# Patient Record
Sex: FEMALE | Race: BLACK OR AFRICAN AMERICAN | NOT HISPANIC OR LATINO
[De-identification: names, ages, dates, MRNs, and addresses within clinical notes are randomized per-mention and may not be internally consistent; named-entity substitution may affect disease eponyms.]

---

## 2017-01-30 ENCOUNTER — RESULT REVIEW (OUTPATIENT)
Age: 62
End: 2017-01-30

## 2018-12-29 ENCOUNTER — TRANSCRIPTION ENCOUNTER (OUTPATIENT)
Age: 63
End: 2018-12-29

## 2019-06-11 ENCOUNTER — OUTPATIENT (OUTPATIENT)
Dept: OUTPATIENT SERVICES | Facility: HOSPITAL | Age: 64
LOS: 1 days | End: 2019-06-11
Payer: MEDICARE

## 2019-06-11 VITALS
SYSTOLIC BLOOD PRESSURE: 155 MMHG | HEART RATE: 62 BPM | RESPIRATION RATE: 16 BRPM | DIASTOLIC BLOOD PRESSURE: 74 MMHG | TEMPERATURE: 98 F | HEIGHT: 64 IN | WEIGHT: 125 LBS | OXYGEN SATURATION: 98 %

## 2019-06-11 DIAGNOSIS — I50.22 CHRONIC SYSTOLIC (CONGESTIVE) HEART FAILURE: ICD-10-CM

## 2019-06-11 DIAGNOSIS — K63.5 POLYP OF COLON: Chronic | ICD-10-CM

## 2019-06-11 LAB
ALBUMIN SERPL ELPH-MCNC: 4.7 G/DL — SIGNIFICANT CHANGE UP (ref 3.3–5)
ALP SERPL-CCNC: 62 U/L — SIGNIFICANT CHANGE UP (ref 40–120)
ALT FLD-CCNC: 15 U/L — SIGNIFICANT CHANGE UP (ref 10–45)
ANION GAP SERPL CALC-SCNC: 15 MMOL/L — SIGNIFICANT CHANGE UP (ref 5–17)
APTT BLD: 37.8 SEC — HIGH (ref 27.5–36.3)
AST SERPL-CCNC: 19 U/L — SIGNIFICANT CHANGE UP (ref 10–40)
BILIRUB SERPL-MCNC: 4 MG/DL — HIGH (ref 0.2–1.2)
BLD GP AB SCN SERPL QL: NEGATIVE — SIGNIFICANT CHANGE UP
BUN SERPL-MCNC: 12 MG/DL — SIGNIFICANT CHANGE UP (ref 7–23)
CALCIUM SERPL-MCNC: 10 MG/DL — SIGNIFICANT CHANGE UP (ref 8.4–10.5)
CHLORIDE SERPL-SCNC: 101 MMOL/L — SIGNIFICANT CHANGE UP (ref 96–108)
CO2 SERPL-SCNC: 27 MMOL/L — SIGNIFICANT CHANGE UP (ref 22–31)
CREAT SERPL-MCNC: 0.61 MG/DL — SIGNIFICANT CHANGE UP (ref 0.5–1.3)
GLUCOSE SERPL-MCNC: 168 MG/DL — HIGH (ref 70–99)
HCT VFR BLD CALC: 33.6 % — LOW (ref 34.5–45)
HGB BLD-MCNC: SIGNIFICANT CHANGE UP (ref 11.5–15.5)
INR BLD: 1.14 RATIO — SIGNIFICANT CHANGE UP (ref 0.88–1.16)
MCHC RBC-ENTMCNC: SIGNIFICANT CHANGE UP (ref 27–34)
MCHC RBC-ENTMCNC: SIGNIFICANT CHANGE UP GM/DL (ref 32–36)
MCV RBC AUTO: 80.1 FL — SIGNIFICANT CHANGE UP (ref 80–100)
PLATELET # BLD AUTO: 220 K/UL — SIGNIFICANT CHANGE UP (ref 150–400)
POTASSIUM SERPL-MCNC: 3.8 MMOL/L — SIGNIFICANT CHANGE UP (ref 3.5–5.3)
POTASSIUM SERPL-SCNC: 3.8 MMOL/L — SIGNIFICANT CHANGE UP (ref 3.5–5.3)
PROT SERPL-MCNC: 8.6 G/DL — HIGH (ref 6–8.3)
PROTHROM AB SERPL-ACNC: 13 SEC — HIGH (ref 10–12.9)
RBC # BLD: 4.2 M/UL — SIGNIFICANT CHANGE UP (ref 3.8–5.2)
RBC # FLD: 14.7 % — HIGH (ref 10.3–14.5)
RH IG SCN BLD-IMP: POSITIVE — SIGNIFICANT CHANGE UP
SODIUM SERPL-SCNC: 143 MMOL/L — SIGNIFICANT CHANGE UP (ref 135–145)
WBC # BLD: 7.3 K/UL — SIGNIFICANT CHANGE UP (ref 3.8–10.5)
WBC # FLD AUTO: 7.3 K/UL — SIGNIFICANT CHANGE UP (ref 3.8–10.5)

## 2019-06-11 PROCEDURE — 86850 RBC ANTIBODY SCREEN: CPT

## 2019-06-11 PROCEDURE — G0463: CPT

## 2019-06-11 PROCEDURE — 85610 PROTHROMBIN TIME: CPT

## 2019-06-11 PROCEDURE — 93005 ELECTROCARDIOGRAM TRACING: CPT

## 2019-06-11 PROCEDURE — 86901 BLOOD TYPING SEROLOGIC RH(D): CPT

## 2019-06-11 PROCEDURE — 85730 THROMBOPLASTIN TIME PARTIAL: CPT

## 2019-06-11 PROCEDURE — 80053 COMPREHEN METABOLIC PANEL: CPT

## 2019-06-11 PROCEDURE — 93010 ELECTROCARDIOGRAM REPORT: CPT

## 2019-06-11 PROCEDURE — 86900 BLOOD TYPING SEROLOGIC ABO: CPT

## 2019-06-11 PROCEDURE — 85027 COMPLETE CBC AUTOMATED: CPT

## 2019-06-11 RX ORDER — SPIRONOLACTONE 25 MG/1
1 TABLET, FILM COATED ORAL
Qty: 0 | Refills: 0 | DISCHARGE

## 2019-06-11 RX ORDER — SACUBITRIL AND VALSARTAN 24; 26 MG/1; MG/1
1 TABLET, FILM COATED ORAL
Qty: 0 | Refills: 0 | DISCHARGE

## 2019-06-11 RX ORDER — FUROSEMIDE 40 MG
1 TABLET ORAL
Qty: 0 | Refills: 0 | DISCHARGE

## 2019-06-11 RX ORDER — CARVEDILOL PHOSPHATE 80 MG/1
1 CAPSULE, EXTENDED RELEASE ORAL
Qty: 0 | Refills: 0 | DISCHARGE

## 2019-06-11 RX ORDER — ENOXAPARIN SODIUM 100 MG/ML
20 INJECTION SUBCUTANEOUS
Qty: 0 | Refills: 0 | DISCHARGE

## 2019-06-11 RX ORDER — DIGOXIN 250 MCG
1 TABLET ORAL
Qty: 0 | Refills: 0 | DISCHARGE

## 2019-06-11 RX ORDER — INSULIN LISPRO 100/ML
4 VIAL (ML) SUBCUTANEOUS
Qty: 0 | Refills: 0 | DISCHARGE

## 2019-06-11 RX ORDER — ASPIRIN/CALCIUM CARB/MAGNESIUM 324 MG
1 TABLET ORAL
Qty: 0 | Refills: 0 | DISCHARGE

## 2019-06-11 NOTE — H&P CARDIOLOGY - HISTORY OF PRESENT ILLNESS
This is a 65y/o AA female with PMHX of HTN, Type 2 DM on insulin compliant with meds does not recall last Hgb AIC uncomplicated, Newly diagnosed CHF NYHA EF 26% ( recent done 5/2019 ) currently on Life Vest currently since Jan 2019. Pt presented to Dr. Lopez with SOB for 2days upon exertion denies any CP no lightheadedness or dizziness no syncopal episodes no palpitations noted. Pt now presents for PST today and scheduled AICD on 6/12/19 with Dr Neeta Lopez . Denies any medals or recent implants in body. Currently CP free no acute distress noted. Pt daughter Isabela at bedside. This is a 63y/o AA female with PMHX of HTN, Type 2 DM on insulin compliant with meds does not recall last Hgb AIC uncomplicated, Newly diagnosed CHF NYHA EF 26% ( recent done 5/2019 as per daughter Isabela  ) no report noted currently on Life Vest currently since Jan 2019. Pt presented to Dr. Lopez with SOB for 2days upon exertion denies any CP no lightheadedness or dizziness no syncopal episodes no palpitations noted. Pt now presents for PST today and scheduled AICD on 6/12/19 with Dr Neeta Lopez . Denies any medals or recent implants in body. Currently CP free no acute distress noted denies any lightheadedness or dizziness . Pt daughter Isabela at bedside. This is a 65y/o AA female with PMHX of HTN, Type 2 DM on insulin compliant with meds does not recall last Hgb AIC uncomplicated, Newly diagnosed CHF NYHA EF 26% ( recent done 5/2019 as per daughter Isabela  ) no report noted currently on Life Vest  since Jan 2019. Pt recently  presented to Dr. Lopez  with SOB for 2days upon exertion denies any CP no lightheadedness or dizziness no syncopal episodes no palpitations noted.   Pt now presents for PST today and scheduled AICD on 6/12/19 with Dr Neeta Lopez . Denies any medals or recent implantable devices noted in body. Currently CP free no acute distress noted denies any lightheadedness or dizziness . Pt daughter Isabela at bedside.

## 2019-06-11 NOTE — H&P CARDIOLOGY - PMH
Diabetes    HTN (hypertension) CHF (congestive heart failure)    Diabetes    HTN (hypertension)    Polyp of colon  removed 2017

## 2019-06-12 ENCOUNTER — INPATIENT (INPATIENT)
Facility: HOSPITAL | Age: 64
LOS: 0 days | Discharge: HOME | DRG: 293 | End: 2019-06-12
Attending: INTERNAL MEDICINE | Admitting: INTERNAL MEDICINE
Payer: MEDICARE

## 2019-06-12 DIAGNOSIS — I50.22 CHRONIC SYSTOLIC (CONGESTIVE) HEART FAILURE: ICD-10-CM

## 2019-06-12 DIAGNOSIS — K63.5 POLYP OF COLON: Chronic | ICD-10-CM

## 2019-06-12 LAB
ALBUMIN SERPL ELPH-MCNC: 4.4 G/DL — SIGNIFICANT CHANGE UP (ref 3.3–5)
ALP SERPL-CCNC: 52 U/L — SIGNIFICANT CHANGE UP (ref 40–120)
ALT FLD-CCNC: 17 U/L — SIGNIFICANT CHANGE UP (ref 10–45)
ANION GAP SERPL CALC-SCNC: 10 MMOL/L — SIGNIFICANT CHANGE UP (ref 5–17)
APTT BLD: 39.1 SEC — HIGH (ref 27.5–36.3)
AST SERPL-CCNC: 22 U/L — SIGNIFICANT CHANGE UP (ref 10–40)
BILIRUB DIRECT SERPL-MCNC: 0.3 MG/DL — HIGH (ref 0–0.2)
BILIRUB INDIRECT FLD-MCNC: 3.4 MG/DL — HIGH (ref 0.2–1)
BILIRUB SERPL-MCNC: 3.7 MG/DL — HIGH (ref 0.2–1.2)
BILIRUB SERPL-MCNC: 3.9 MG/DL — HIGH (ref 0.2–1.2)
BUN SERPL-MCNC: 12 MG/DL — SIGNIFICANT CHANGE UP (ref 7–23)
CALCIUM SERPL-MCNC: 10 MG/DL — SIGNIFICANT CHANGE UP (ref 8.4–10.5)
CHLORIDE SERPL-SCNC: 102 MMOL/L — SIGNIFICANT CHANGE UP (ref 96–108)
CO2 SERPL-SCNC: 30 MMOL/L — SIGNIFICANT CHANGE UP (ref 22–31)
CREAT SERPL-MCNC: 0.62 MG/DL — SIGNIFICANT CHANGE UP (ref 0.5–1.3)
GLUCOSE BLDC GLUCOMTR-MCNC: 194 MG/DL — HIGH (ref 70–99)
GLUCOSE SERPL-MCNC: 171 MG/DL — HIGH (ref 70–99)
HCT VFR BLD CALC: 30.5 % — LOW (ref 34.5–45)
HGB BLD-MCNC: 11.3 G/DL — LOW (ref 11.5–15.5)
INR BLD: 1.17 RATIO — HIGH (ref 0.88–1.16)
MCHC RBC-ENTMCNC: 29.7 PG — SIGNIFICANT CHANGE UP (ref 27–34)
MCHC RBC-ENTMCNC: 36.9 GM/DL — HIGH (ref 32–36)
MCV RBC AUTO: 80.4 FL — SIGNIFICANT CHANGE UP (ref 80–100)
PLATELET # BLD AUTO: 206 K/UL — SIGNIFICANT CHANGE UP (ref 150–400)
POTASSIUM SERPL-MCNC: 3.9 MMOL/L — SIGNIFICANT CHANGE UP (ref 3.5–5.3)
POTASSIUM SERPL-SCNC: 3.9 MMOL/L — SIGNIFICANT CHANGE UP (ref 3.5–5.3)
PROT SERPL-MCNC: 7.7 G/DL — SIGNIFICANT CHANGE UP (ref 6–8.3)
PROTHROM AB SERPL-ACNC: 13.5 SEC — HIGH (ref 10–12.9)
RBC # BLD: 3.79 M/UL — LOW (ref 3.8–5.2)
RBC # FLD: 14.7 % — HIGH (ref 10.3–14.5)
RH IG SCN BLD-IMP: POSITIVE — SIGNIFICANT CHANGE UP
SODIUM SERPL-SCNC: 142 MMOL/L — SIGNIFICANT CHANGE UP (ref 135–145)
WBC # BLD: 5.7 K/UL — SIGNIFICANT CHANGE UP (ref 3.8–10.5)
WBC # FLD AUTO: 5.7 K/UL — SIGNIFICANT CHANGE UP (ref 3.8–10.5)

## 2019-06-12 PROCEDURE — 76705 ECHO EXAM OF ABDOMEN: CPT | Mod: 26,RT

## 2019-06-12 NOTE — CONSULT NOTE ADULT - SUBJECTIVE AND OBJECTIVE BOX
SUBJECTIVE:  64yFemale here for AICD placement  Noted to have elevated bili on pre-procedure labs  per patient/family she was told of elevated bili six months ago at [facility] but "then it came down"  she denies any prior hx of liver disease or hepatitis  denies jaundice, pale stools or dark urine  no herbal medication use  no abdominal pain, nausea, vomiting or abdominal distention  no easy bruising    ______________________________________________________________________  PMH/PSH:  PAST MEDICAL & SURGICAL HISTORY:  Polyp of colon: removed 2017 - s/p right hemicolectomy for malignant polyp (Dr. Reese, [facility])  CHF (congestive heart failure)  HTN (hypertension)  Diabetes  Colorectal polyp detected on colonoscopy  GERD  Thyroid nodules for which she needs surgery  EGD with H. pylori (treated in 2017)    ______________________________________________________________________  MEDS:  MEDICATIONS  (STANDING):  •	ENTRESTO 49-51 mg Tab tablet	TAKE 1 TABLET BY MOUTH TWICE A DAY	180 tablet	1	  •	LANTUS SOLOSTAR U-100 INSULIN 100 unit/mL (3 mL) injection	Inject 20 Units into the skin nightly.	18 mL	1	  •	spironolactone (ALDACTONE) 25 mg tablet	TAKE 1 TABLET BY MOUTH EVERY 48 HOURS.	45 tablet	1	  •	carvedilol (COREG) 25 mg tablet	Take 1 tablet by mouth 2 (two) times daily.	180 tablet	1	  •	digoxin (LANOXIN) 125 mcg tablet	Take 1 tablet by mouth daily.	90 tablet	0	  •	ALCOHOL PREP PADS	TEST 4 TIMES A DAY	400 Each	1	  •	Blood Sugar Diagnostic	Test 4 times daily  as directed. Dx E 11.65 on insulin. Advance test strips	400 Strip	1	  •	HUMALOG KWIKPEN INSULIN 100 unit/mL injection	Inject 4 Units into the skin 3 (three) times daily.	10.8 mL	1	  •	BRIAN PEN NEEDLE 32 gauge x 5/32" Ndle	TEST 4 TIMES A DAY	400 Each	1	  •	furosemide (LASIX) 20 mg tablet	Take 2 tablets by mouth daily. 40 mg orally daily	180 tablet	1	  •	ipratropium-albuterol (DUONEB) 0.5 mg-3 mg(2.5 mg base)/3 mL nebulizer solution	Take 3 mL by nebulization every 6 hours as needed for Wheezing.			  •	aspirin (ECOTRIN) 81 mg EC tablet	Take 81 mg by mouth daily.		0	      MEDICATIONS  (PRN):    ______________________________________________________________________  ALL:   Allergies    No Known Allergies    Intolerances      ______________________________________________________________________  SH: lives alone, but daughters are close  ______________________________________________________________________  FH:  FAMILY HISTORY:  cancer in mother (stomach) and aunt    ______________________________________________________________________  ROS:    CONSTITUTIONAL:  No weight loss, fever, chills, weakness or fatigue.    HEENT:  Eyes:  No visual loss, blurred vision, double vision or yellow sclerae. Ears, Nose, Throat:  No hearing loss, sneezing, congestion, runny nose or sore throat.    SKIN:  No rash or itching.    CARDIOVASCULAR:  No chest pain, chest pressure or chest discomfort. No palpitations or edema.    RESPIRATORY:  No shortness of breath, cough or sputum.    GASTROINTESTINAL:  SEE HPI    GENITOURINARY:  No dysuria, hematuria, urinary frequency    NEUROLOGICAL:  No headache, dizziness, syncope, paralysis, ataxia, numbness or tingling in the extremities. No change in bowel or bladder control.    MUSCULOSKELETAL:  No muscle, back pain, joint pain or stiffness.    HEMATOLOGIC:  No anemia, bleeding or bruising.    LYMPHATICS:  No enlarged nodes. No history of splenectomy.    PSYCHIATRIC:  No history of depression or anxiety.    ENDOCRINOLOGIC:  No reports of sweating, cold or heat intolerance. No polyuria or polydipsia.    ALLERGIES:  No history of asthma, hives, eczema or rhinitis.  ______________________________________________________________________  PHYSICAL EXAM:  BP:	110/70	  Pulse:	64	  Resp:	12	  Weight:	55.3 kg (122 lb)	  Height:	1.626 m (5' 4")	    PHYSICAL EXAM:      Constitutional: anicteric, nad    Neck: +thyroid nodules    Respiratory: cta    Cardiovascular: rr    Gastrointestinal: soft ntnd +bs no hsm no r/g    Extremities: no c/c    Vascular: no spider angiomata    Neurological: non focal, no asterixis    Psychiatric: a&o x 3      ______________________________________________________________________  LABS:                        11.3   5.7   )-----------( 206      ( 12 Jun 2019 08:17 )             30.5     06-12    142  |  102  |  12  ----------------------------<  171<H>  3.9   |  30  |  0.62    Ca    10.0      12 Jun 2019 09:09    TPro  7.7  /  Alb  4.4  /  TBili  3.9<H>  /  DBili  x   /  AST  22  /  ALT  17  /  AlkPhos  52  06-12    LIVER FUNCTIONS - ( 12 Jun 2019 09:09 )  Alb: 4.4 g/dL / Pro: 7.7 g/dL / ALK PHOS: 52 U/L / ALT: 17 U/L / AST: 22 U/L / GGT: x           PT/INR - ( 12 Jun 2019 09:09 )   PT: 13.5 sec;   INR: 1.17 ratio         PTT - ( 12 Jun 2019 09:09 )  PTT:39.1 sec    I reviewed her prior labs in the [facility] system dating back to 2015 - alt/ast normal, but bili always up between 2.5-4.2 range    ______________________________________________________________________  IMAGING:  no pertinent imaging at [facility]  ______________________________________________________________________  ASSESSMENT:  64y Female with isolated hyperbilirubinemia and minimally elevated INR.  I suspect she has Gilbert's disease as cause of elevated bilirubin.  Her INR is minimally elevated and unlikely to represent any significant degree of liver dysfunction.    PLAN:  1.  Will get direct/indirect bilirubin  2.  Will get RUQ sono today  3.  Patient can be discharged after the tests to see me in follow up tomorrow to review results but I expect her to be cleared for AICD placement  4.  D/w patient and family      Mir Davila M.D.  [facility]  O) [phone]

## 2019-06-14 PROCEDURE — 85027 COMPLETE CBC AUTOMATED: CPT

## 2019-06-14 PROCEDURE — 86900 BLOOD TYPING SEROLOGIC ABO: CPT

## 2019-06-14 PROCEDURE — G0378: CPT

## 2019-06-14 PROCEDURE — 76705 ECHO EXAM OF ABDOMEN: CPT

## 2019-06-14 PROCEDURE — 80053 COMPREHEN METABOLIC PANEL: CPT

## 2019-06-14 PROCEDURE — 85730 THROMBOPLASTIN TIME PARTIAL: CPT

## 2019-06-14 PROCEDURE — 86901 BLOOD TYPING SEROLOGIC RH(D): CPT

## 2019-06-14 PROCEDURE — 82962 GLUCOSE BLOOD TEST: CPT

## 2019-06-14 PROCEDURE — 85610 PROTHROMBIN TIME: CPT

## 2019-06-14 PROCEDURE — 82247 BILIRUBIN TOTAL: CPT

## 2019-06-14 PROCEDURE — 82248 BILIRUBIN DIRECT: CPT

## 2019-06-17 ENCOUNTER — TRANSCRIPTION ENCOUNTER (OUTPATIENT)
Age: 64
End: 2019-06-17

## 2019-06-17 ENCOUNTER — INPATIENT (INPATIENT)
Facility: HOSPITAL | Age: 64
LOS: 0 days | Discharge: HOME | DRG: 227 | End: 2019-06-18
Attending: INTERNAL MEDICINE | Admitting: INTERNAL MEDICINE
Payer: MEDICARE

## 2019-06-17 VITALS
OXYGEN SATURATION: 100 % | HEIGHT: 63 IN | SYSTOLIC BLOOD PRESSURE: 138 MMHG | WEIGHT: 126.99 LBS | DIASTOLIC BLOOD PRESSURE: 61 MMHG | RESPIRATION RATE: 18 BRPM | TEMPERATURE: 98 F | HEART RATE: 62 BPM

## 2019-06-17 DIAGNOSIS — I42.9 CARDIOMYOPATHY, UNSPECIFIED: ICD-10-CM

## 2019-06-17 DIAGNOSIS — K63.5 POLYP OF COLON: Chronic | ICD-10-CM

## 2019-06-17 PROBLEM — I50.9 HEART FAILURE, UNSPECIFIED: Chronic | Status: ACTIVE | Noted: 2019-06-11

## 2019-06-17 LAB
ALBUMIN SERPL ELPH-MCNC: 4.9 G/DL — SIGNIFICANT CHANGE UP (ref 3.3–5)
ALP SERPL-CCNC: 60 U/L — SIGNIFICANT CHANGE UP (ref 40–120)
ALT FLD-CCNC: 13 U/L — SIGNIFICANT CHANGE UP (ref 10–45)
ANION GAP SERPL CALC-SCNC: 14 MMOL/L — SIGNIFICANT CHANGE UP (ref 5–17)
APTT BLD: 40.2 SEC — HIGH (ref 27.5–36.3)
AST SERPL-CCNC: 16 U/L — SIGNIFICANT CHANGE UP (ref 10–40)
BILIRUB SERPL-MCNC: 3.7 MG/DL — HIGH (ref 0.2–1.2)
BUN SERPL-MCNC: 12 MG/DL — SIGNIFICANT CHANGE UP (ref 7–23)
CALCIUM SERPL-MCNC: 10.2 MG/DL — SIGNIFICANT CHANGE UP (ref 8.4–10.5)
CHLORIDE SERPL-SCNC: 104 MMOL/L — SIGNIFICANT CHANGE UP (ref 96–108)
CO2 SERPL-SCNC: 26 MMOL/L — SIGNIFICANT CHANGE UP (ref 22–31)
CREAT SERPL-MCNC: 0.61 MG/DL — SIGNIFICANT CHANGE UP (ref 0.5–1.3)
GLUCOSE BLDC GLUCOMTR-MCNC: 100 MG/DL — HIGH (ref 70–99)
GLUCOSE BLDC GLUCOMTR-MCNC: 160 MG/DL — HIGH (ref 70–99)
GLUCOSE BLDC GLUCOMTR-MCNC: 360 MG/DL — HIGH (ref 70–99)
GLUCOSE BLDC GLUCOMTR-MCNC: 369 MG/DL — HIGH (ref 70–99)
GLUCOSE SERPL-MCNC: 122 MG/DL — HIGH (ref 70–99)
HCT VFR BLD CALC: 34.1 % — LOW (ref 34.5–45)
HGB BLD-MCNC: 12.6 G/DL — SIGNIFICANT CHANGE UP (ref 11.5–15.5)
INR BLD: 1.16 RATIO — SIGNIFICANT CHANGE UP (ref 0.88–1.16)
MCHC RBC-ENTMCNC: 29.7 PG — SIGNIFICANT CHANGE UP (ref 27–34)
MCHC RBC-ENTMCNC: 37 GM/DL — HIGH (ref 32–36)
MCV RBC AUTO: 80.3 FL — SIGNIFICANT CHANGE UP (ref 80–100)
PLATELET # BLD AUTO: 249 K/UL — SIGNIFICANT CHANGE UP (ref 150–400)
POTASSIUM SERPL-MCNC: 3.8 MMOL/L — SIGNIFICANT CHANGE UP (ref 3.5–5.3)
POTASSIUM SERPL-SCNC: 3.8 MMOL/L — SIGNIFICANT CHANGE UP (ref 3.5–5.3)
PROT SERPL-MCNC: 8.5 G/DL — HIGH (ref 6–8.3)
PROTHROM AB SERPL-ACNC: 13.3 SEC — HIGH (ref 10–12.9)
RBC # BLD: 4.25 M/UL — SIGNIFICANT CHANGE UP (ref 3.8–5.2)
RBC # FLD: 14.7 % — HIGH (ref 10.3–14.5)
SODIUM SERPL-SCNC: 144 MMOL/L — SIGNIFICANT CHANGE UP (ref 135–145)
WBC # BLD: 5.2 K/UL — SIGNIFICANT CHANGE UP (ref 3.8–10.5)
WBC # FLD AUTO: 5.2 K/UL — SIGNIFICANT CHANGE UP (ref 3.8–10.5)

## 2019-06-17 PROCEDURE — 93010 ELECTROCARDIOGRAM REPORT: CPT | Mod: 76

## 2019-06-17 PROCEDURE — 71045 X-RAY EXAM CHEST 1 VIEW: CPT | Mod: 26

## 2019-06-17 RX ORDER — ASPIRIN/CALCIUM CARB/MAGNESIUM 324 MG
81 TABLET ORAL DAILY
Refills: 0 | Status: DISCONTINUED | OUTPATIENT
Start: 2019-06-17 | End: 2019-06-18

## 2019-06-17 RX ORDER — CEPHALEXIN 500 MG
1 CAPSULE ORAL
Qty: 9 | Refills: 0
Start: 2019-06-17 | End: 2019-06-19

## 2019-06-17 RX ORDER — DEXTROSE 50 % IN WATER 50 %
12.5 SYRINGE (ML) INTRAVENOUS ONCE
Refills: 0 | Status: DISCONTINUED | OUTPATIENT
Start: 2019-06-17 | End: 2019-06-18

## 2019-06-17 RX ORDER — CEPHALEXIN 500 MG
500 CAPSULE ORAL THREE TIMES A DAY
Refills: 0 | Status: DISCONTINUED | OUTPATIENT
Start: 2019-06-18 | End: 2019-06-18

## 2019-06-17 RX ORDER — CEPHALEXIN 500 MG
1 CAPSULE ORAL
Qty: 15 | Refills: 0
Start: 2019-06-17 | End: 2019-06-21

## 2019-06-17 RX ORDER — GLUCAGON INJECTION, SOLUTION 0.5 MG/.1ML
1 INJECTION, SOLUTION SUBCUTANEOUS ONCE
Refills: 0 | Status: DISCONTINUED | OUTPATIENT
Start: 2019-06-17 | End: 2019-06-18

## 2019-06-17 RX ORDER — INSULIN GLARGINE 100 [IU]/ML
16 INJECTION, SOLUTION SUBCUTANEOUS AT BEDTIME
Refills: 0 | Status: DISCONTINUED | OUTPATIENT
Start: 2019-06-17 | End: 2019-06-18

## 2019-06-17 RX ORDER — SACUBITRIL AND VALSARTAN 24; 26 MG/1; MG/1
1 TABLET, FILM COATED ORAL
Refills: 0 | Status: DISCONTINUED | OUTPATIENT
Start: 2019-06-17 | End: 2019-06-18

## 2019-06-17 RX ORDER — DEXTROSE 50 % IN WATER 50 %
25 SYRINGE (ML) INTRAVENOUS ONCE
Refills: 0 | Status: DISCONTINUED | OUTPATIENT
Start: 2019-06-17 | End: 2019-06-18

## 2019-06-17 RX ORDER — DEXTROSE 50 % IN WATER 50 %
15 SYRINGE (ML) INTRAVENOUS ONCE
Refills: 0 | Status: DISCONTINUED | OUTPATIENT
Start: 2019-06-17 | End: 2019-06-18

## 2019-06-17 RX ORDER — FUROSEMIDE 40 MG
40 TABLET ORAL DAILY
Refills: 0 | Status: DISCONTINUED | OUTPATIENT
Start: 2019-06-17 | End: 2019-06-18

## 2019-06-17 RX ORDER — DIGOXIN 250 MCG
0.12 TABLET ORAL DAILY
Refills: 0 | Status: DISCONTINUED | OUTPATIENT
Start: 2019-06-17 | End: 2019-06-18

## 2019-06-17 RX ORDER — OXYCODONE AND ACETAMINOPHEN 5; 325 MG/1; MG/1
1 TABLET ORAL EVERY 4 HOURS
Refills: 0 | Status: DISCONTINUED | OUTPATIENT
Start: 2019-06-17 | End: 2019-06-18

## 2019-06-17 RX ORDER — SPIRONOLACTONE 25 MG/1
25 TABLET, FILM COATED ORAL DAILY
Refills: 0 | Status: DISCONTINUED | OUTPATIENT
Start: 2019-06-17 | End: 2019-06-18

## 2019-06-17 RX ORDER — ACETAMINOPHEN WITH CODEINE 300MG-30MG
1 TABLET ORAL EVERY 4 HOURS
Refills: 0 | Status: DISCONTINUED | OUTPATIENT
Start: 2019-06-17 | End: 2019-06-18

## 2019-06-17 RX ORDER — INSULIN LISPRO 100/ML
VIAL (ML) SUBCUTANEOUS
Refills: 0 | Status: DISCONTINUED | OUTPATIENT
Start: 2019-06-17 | End: 2019-06-18

## 2019-06-17 RX ORDER — CEPHALEXIN 500 MG
500 CAPSULE ORAL THREE TIMES A DAY
Refills: 0 | Status: DISCONTINUED | OUTPATIENT
Start: 2019-06-17 | End: 2019-06-17

## 2019-06-17 RX ORDER — CARVEDILOL PHOSPHATE 80 MG/1
25 CAPSULE, EXTENDED RELEASE ORAL EVERY 12 HOURS
Refills: 0 | Status: DISCONTINUED | OUTPATIENT
Start: 2019-06-17 | End: 2019-06-18

## 2019-06-17 RX ORDER — INSULIN LISPRO 100/ML
VIAL (ML) SUBCUTANEOUS AT BEDTIME
Refills: 0 | Status: DISCONTINUED | OUTPATIENT
Start: 2019-06-17 | End: 2019-06-18

## 2019-06-17 RX ORDER — INSULIN LISPRO 100/ML
2 VIAL (ML) SUBCUTANEOUS
Refills: 0 | Status: DISCONTINUED | OUTPATIENT
Start: 2019-06-17 | End: 2019-06-18

## 2019-06-17 RX ORDER — ACETAMINOPHEN WITH CODEINE 300MG-30MG
2 TABLET ORAL EVERY 4 HOURS
Refills: 0 | Status: DISCONTINUED | OUTPATIENT
Start: 2019-06-17 | End: 2019-06-18

## 2019-06-17 RX ORDER — SODIUM CHLORIDE 9 MG/ML
1000 INJECTION, SOLUTION INTRAVENOUS
Refills: 0 | Status: DISCONTINUED | OUTPATIENT
Start: 2019-06-17 | End: 2019-06-18

## 2019-06-17 RX ORDER — CEFAZOLIN SODIUM 1 G
1000 VIAL (EA) INJECTION EVERY 8 HOURS
Refills: 0 | Status: COMPLETED | OUTPATIENT
Start: 2019-06-17 | End: 2019-06-18

## 2019-06-17 RX ADMIN — SACUBITRIL AND VALSARTAN 1 TABLET(S): 24; 26 TABLET, FILM COATED ORAL at 18:52

## 2019-06-17 RX ADMIN — Medication 1 TABLET(S): at 18:17

## 2019-06-17 RX ADMIN — CARVEDILOL PHOSPHATE 25 MILLIGRAM(S): 80 CAPSULE, EXTENDED RELEASE ORAL at 18:39

## 2019-06-17 RX ADMIN — Medication 100 MILLIGRAM(S): at 22:22

## 2019-06-17 RX ADMIN — INSULIN GLARGINE 16 UNIT(S): 100 INJECTION, SOLUTION SUBCUTANEOUS at 22:22

## 2019-06-17 RX ADMIN — Medication 2 TABLET(S): at 22:29

## 2019-06-17 RX ADMIN — Medication 1 TABLET(S): at 18:51

## 2019-06-17 RX ADMIN — Medication 2 TABLET(S): at 23:14

## 2019-06-17 RX ADMIN — Medication 3: at 22:21

## 2019-06-17 NOTE — DISCHARGE NOTE PROVIDER - NSDCCPTREATMENT_GEN_ALL_CORE_FT
PRINCIPAL PROCEDURE  Procedure: AICD implantation  Findings and Treatment: s/p AICD via left anterior chest wall PRINCIPAL PROCEDURE  Procedure: AICD implantation  Findings and Treatment: s/p AICD via left  infraclavicular wall( Medtronic DDD-R-

## 2019-06-17 NOTE — DISCHARGE NOTE PROVIDER - NSDCCPCAREPLAN_GEN_ALL_CORE_FT
PRINCIPAL DISCHARGE DIAGNOSIS  Diagnosis: AICD (automatic cardioverter/defibrillator) present  Assessment and Plan of Treatment: Your incision will be without infection. Your heartbeat will remain controlled. Appointment: follow up with your electrophysiology (EP) doctor in 7-10 days after discharge. Please call the EP office ([phone]) to make appointment.   Incision care (where your cut was made): sugical glue will naturally fall off our skin.  Do not scrub, rub, or pick at the surgical glue. Call your doctor if you have any fever; chills; redness; swelling; increased soreness; warmth or drainage at the incision site.    ID Card: Do carry your ICD Identification (ID) card with you at all times.   Call your doctor immediately if you have hiccups for a long time, fainting, dizziness, lightheadedness, palpitations, chest pain or the same symptoms that you had before the procedure.   You should not have a MRI unless you have a MRI safe device.   IF YOU RECEIVE A SHOCK: • If you receive 1 shock, you must call our EP office. • If you receive 2 or more shocks, you should call 911 and go to ER for further evaluation or treatment by the EP team.

## 2019-06-17 NOTE — CHART NOTE - NSCHARTNOTEFT_GEN_A_CORE
Procedure :CRT ICD implant    Indication: dilated cardiomyopathy, CHF, NYHA class 2-3, LBBB    Proceduralist: Dr John Nixon    After risks , benefits, and alternatives explained to patient and family, and consent obtained, the patient was brought to the electrophysiology laboratory in the post absorptive state. The patient was draped and prepped in the usual sterile manner and sedated by anesthesia. A venogram revealed a patent left subclavian vein.  After adequate anesthesia, 20 cc of xylocaine Sensorcaine was injected into the left deltopectoral groove, and a cutdown was performed in the subcutaneous layers, and a pocket was appropriately fashioned over the superficial pectoral fascia. . thereafter using standard Seldinger technique, two sheaths were placed in the subclavian vein, and using a retained wire , the one sheath was used to place appropriate leads in the right atrium and the right ventricle, and with the second sheath, using a deflectable catheter and approximately 20 cc contrast, the left ventricular lead was placed in the posterior lateral branch of the coronary sinus. After adequate threshold were obtained, all leads were secured to the deeper layers of the pocket using 2.0 Ethibond, The pocket was irrigated copiously with bacitracin. The ICD was placed in an antibacterial pouch and was attached to the leads. Thereafter the device was appropriately placed in the pocket and also secured with 2.0 Ethibond. The wound was then closed with  2.0 then 3.0 running Vicryl and 4.0 Monocryl. The wound was closed with Dermabond and a Steri-Strip was placed. The patient was returned to the recovery room in stable condition. 2 grams Ancef were given preoperatively for antibiotic prophylaxis. Thresshold, P and R waves were within acceptable limits. The patient was placed DDDR  with mode switch on,   BPM    The patient has a Medtronic Claria MR uad CRT  D surescan SN [id number]  ICD,   the  right ventricular  lead is a Medtronic 6935M  SN [id number]  The atrial lead is a Medtronic 5076 SN [id number]  The left ventricular lead is a Medtronic 4298 SN [id number]    Advise    1 CXR portable now, the  PA/ lat CXR in AM  2. stat EKG  3. ancef 1 g IV q 8 x2 then keflex 500 TID x5 day  4. tylenol #3 one to two tablets q 4 PRN pain  5. resume all meds

## 2019-06-17 NOTE — DISCHARGE NOTE PROVIDER - HOSPITAL COURSE
This is a 63y/o AA female with PMHX of HTN, Type 2 DM on insulin compliant with meds does not recall last Hgb AIC uncomplicated, Newly diagnosed CHF NYHA EF 26% ( recent done 5/2019 as per daughter Isabela  ) no report noted currently on Life Vest  since Jan 2019. Pt recently  presented to Dr. Lopez  with SOB for 2days upon exertion denies any CP no lightheadedness or dizziness no syncopal episodes no palpitations noted.     Pt now presents for PST today and scheduled AICD on 6/12/19 with Dr Neeta Lopez . Denies any medals or recent implantable devices noted in body. Pt is now s/p AICD implantation via left anterior chest wall. Pt tolerated the procedure well, cath site benign. Post-procedure discharge instructions discussed and questions addressed This is a 63y/o AA female with PMHX of HTN, Type 2 DM on insulin compliant with meds does not recall last Hgb AIC uncomplicated, Newly diagnosed CHF NYHA EF 26% ( recent done 5/2019 as per daughter Isabela  ) no report noted currently on Life Vest  since Jan 2019. Pt recently  presented to Dr. Lopez  with SOB for 2days upon exertion denies any CP no lightheadedness or dizziness no syncopal episodes no palpitations noted.     Pt now presents for PST today and scheduled AICD on 6/12/19 with Dr Neeta Lopez . Denies any medals or recent implantable devices noted in body. Pt is now s/p AICD implantation via left anterior chest wall. Pt tolerated the procedure well, cath site benign. Post-procedure discharge instructions discussed and questions addressed.        Chest Xray- Clear lungs. No pneumothorax.

## 2019-06-17 NOTE — DISCHARGE NOTE PROVIDER - CARE PROVIDER_API CALL
John Nixon)  Cardiology  [address], [address]  [place] [address]  Phone: [phone]  Fax: [phone]  Follow Up Time:

## 2019-06-18 ENCOUNTER — TRANSCRIPTION ENCOUNTER (OUTPATIENT)
Age: 64
End: 2019-06-18

## 2019-06-18 VITALS
OXYGEN SATURATION: 97 % | HEART RATE: 70 BPM | SYSTOLIC BLOOD PRESSURE: 116 MMHG | DIASTOLIC BLOOD PRESSURE: 53 MMHG | TEMPERATURE: 98 F | RESPIRATION RATE: 15 BRPM

## 2019-06-18 LAB
ANION GAP SERPL CALC-SCNC: 9 MMOL/L — SIGNIFICANT CHANGE UP (ref 5–17)
BUN SERPL-MCNC: 10 MG/DL — SIGNIFICANT CHANGE UP (ref 7–23)
CALCIUM SERPL-MCNC: 9.3 MG/DL — SIGNIFICANT CHANGE UP (ref 8.4–10.5)
CHLORIDE SERPL-SCNC: 103 MMOL/L — SIGNIFICANT CHANGE UP (ref 96–108)
CO2 SERPL-SCNC: 26 MMOL/L — SIGNIFICANT CHANGE UP (ref 22–31)
CREAT SERPL-MCNC: 0.65 MG/DL — SIGNIFICANT CHANGE UP (ref 0.5–1.3)
GLUCOSE BLDC GLUCOMTR-MCNC: 229 MG/DL — HIGH (ref 70–99)
GLUCOSE BLDC GLUCOMTR-MCNC: 245 MG/DL — HIGH (ref 70–99)
GLUCOSE SERPL-MCNC: 236 MG/DL — HIGH (ref 70–99)
HBA1C BLD-MCNC: 5.8 % — HIGH (ref 4–5.6)
HCT VFR BLD CALC: SIGNIFICANT CHANGE UP % (ref 34.5–45)
HGB BLD-MCNC: 11.1 G/DL — LOW (ref 11.5–15.5)
MCHC RBC-ENTMCNC: SIGNIFICANT CHANGE UP (ref 27–34)
MCHC RBC-ENTMCNC: SIGNIFICANT CHANGE UP GM/DL (ref 32–36)
MCV RBC AUTO: SIGNIFICANT CHANGE UP (ref 80–100)
PLATELET # BLD AUTO: 199 K/UL — SIGNIFICANT CHANGE UP (ref 150–400)
POTASSIUM SERPL-MCNC: 3.9 MMOL/L — SIGNIFICANT CHANGE UP (ref 3.5–5.3)
POTASSIUM SERPL-SCNC: 3.9 MMOL/L — SIGNIFICANT CHANGE UP (ref 3.5–5.3)
RBC # BLD: SIGNIFICANT CHANGE UP (ref 3.8–5.2)
RBC # FLD: SIGNIFICANT CHANGE UP (ref 10.3–14.5)
SODIUM SERPL-SCNC: 138 MMOL/L — SIGNIFICANT CHANGE UP (ref 135–145)
WBC # BLD: 13.9 K/UL — HIGH (ref 3.8–10.5)
WBC # FLD AUTO: 13.9 K/UL — HIGH (ref 3.8–10.5)

## 2019-06-18 PROCEDURE — 80048 BASIC METABOLIC PNL TOTAL CA: CPT

## 2019-06-18 PROCEDURE — C1777: CPT

## 2019-06-18 PROCEDURE — C1769: CPT

## 2019-06-18 PROCEDURE — 33249 INSJ/RPLCMT DEFIB W/LEAD(S): CPT

## 2019-06-18 PROCEDURE — C1900: CPT

## 2019-06-18 PROCEDURE — 93010 ELECTROCARDIOGRAM REPORT: CPT

## 2019-06-18 PROCEDURE — 85027 COMPLETE CBC AUTOMATED: CPT

## 2019-06-18 PROCEDURE — 83036 HEMOGLOBIN GLYCOSYLATED A1C: CPT

## 2019-06-18 PROCEDURE — 33225 L VENTRIC PACING LEAD ADD-ON: CPT

## 2019-06-18 PROCEDURE — 85610 PROTHROMBIN TIME: CPT

## 2019-06-18 PROCEDURE — 85730 THROMBOPLASTIN TIME PARTIAL: CPT

## 2019-06-18 PROCEDURE — C1892: CPT

## 2019-06-18 PROCEDURE — 80053 COMPREHEN METABOLIC PANEL: CPT

## 2019-06-18 PROCEDURE — C1898: CPT

## 2019-06-18 PROCEDURE — 93005 ELECTROCARDIOGRAM TRACING: CPT

## 2019-06-18 PROCEDURE — 71046 X-RAY EXAM CHEST 2 VIEWS: CPT

## 2019-06-18 PROCEDURE — 82962 GLUCOSE BLOOD TEST: CPT

## 2019-06-18 PROCEDURE — C1887: CPT

## 2019-06-18 PROCEDURE — C1882: CPT

## 2019-06-18 PROCEDURE — 71045 X-RAY EXAM CHEST 1 VIEW: CPT

## 2019-06-18 PROCEDURE — 71046 X-RAY EXAM CHEST 2 VIEWS: CPT | Mod: 26

## 2019-06-18 RX ADMIN — Medication 81 MILLIGRAM(S): at 05:33

## 2019-06-18 RX ADMIN — Medication 0.12 MILLIGRAM(S): at 05:33

## 2019-06-18 RX ADMIN — Medication 4: at 11:41

## 2019-06-18 RX ADMIN — Medication 2 UNIT(S): at 07:42

## 2019-06-18 RX ADMIN — SACUBITRIL AND VALSARTAN 1 TABLET(S): 24; 26 TABLET, FILM COATED ORAL at 05:33

## 2019-06-18 RX ADMIN — Medication 2 UNIT(S): at 11:41

## 2019-06-18 RX ADMIN — Medication 40 MILLIGRAM(S): at 05:33

## 2019-06-18 RX ADMIN — SPIRONOLACTONE 25 MILLIGRAM(S): 25 TABLET, FILM COATED ORAL at 05:33

## 2019-06-18 RX ADMIN — Medication 4: at 07:42

## 2019-06-18 RX ADMIN — CARVEDILOL PHOSPHATE 25 MILLIGRAM(S): 80 CAPSULE, EXTENDED RELEASE ORAL at 05:33

## 2019-06-18 RX ADMIN — Medication 500 MILLIGRAM(S): at 05:33

## 2019-06-18 RX ADMIN — Medication 100 MILLIGRAM(S): at 05:34

## 2019-06-18 RX ADMIN — Medication 2 TABLET(S): at 06:23

## 2019-06-18 RX ADMIN — Medication 500 MILLIGRAM(S): at 14:31

## 2019-06-18 RX ADMIN — Medication 2 TABLET(S): at 05:33

## 2019-06-18 NOTE — DISCHARGE NOTE NURSING/CASE MANAGEMENT/SOCIAL WORK - NSDCDPATPORTLINK_GEN_ALL_CORE
You can access the [facility] Portal, offered by [facility], by registering with the following website: [email or web address]

## 2020-02-25 NOTE — H&P CARDIOLOGY - PSYCHIATRIC
Affect and characteristics of appearance, verbalizations, behaviors are appropriate Vermilion Border Text: The closure involved the vermilion border.

## 2020-07-30 NOTE — PATIENT PROFILE ADULT - LAST A1C RESULT
FAMILY HISTORY:  Family history of liver disease  Family history of melanoma, In Eye  Family history of osteoarthritis
7